# Patient Record
Sex: MALE | Race: ASIAN
[De-identification: names, ages, dates, MRNs, and addresses within clinical notes are randomized per-mention and may not be internally consistent; named-entity substitution may affect disease eponyms.]

---

## 2020-12-08 PROBLEM — Z00.00 ENCOUNTER FOR PREVENTIVE HEALTH EXAMINATION: Status: ACTIVE | Noted: 2020-12-08

## 2020-12-16 ENCOUNTER — APPOINTMENT (OUTPATIENT)
Dept: VASCULAR SURGERY | Facility: CLINIC | Age: 60
End: 2020-12-16
Payer: COMMERCIAL

## 2020-12-16 DIAGNOSIS — I83.899 VARICOSE VEINS OF UNSPECIFIED LOWER EXTREMITY WITH OTHER COMPLICATIONS: ICD-10-CM

## 2020-12-16 PROCEDURE — 93971 EXTREMITY STUDY: CPT

## 2020-12-16 PROCEDURE — 99204 OFFICE O/P NEW MOD 45 MIN: CPT

## 2020-12-16 PROCEDURE — 99072 ADDL SUPL MATRL&STAF TM PHE: CPT

## 2020-12-23 NOTE — ADDENDUM
[FreeTextEntry1] : This note was written by Lakeisha Tijerina on 12/16/2020 acting as scribe for Vic Wood M.D.\par \par I, Vic Vizcaino  have read and attest that all the information, medical decision making and discharge instructions within are true and accurate.

## 2020-12-23 NOTE — PHYSICAL EXAM
[Respiratory Effort] : normal respiratory effort [Normal Rate and Rhythm] : normal rate and rhythm [Alert] : alert [Oriented to Person] : oriented to person [Oriented to Place] : oriented to place [Oriented to Time] : oriented to time [Calm] : calm [Abdomen Tenderness] : ~T ~M No abdominal tenderness [de-identified] : Well appearing  [de-identified] : NC/AT  [de-identified] : Supple  [de-identified] : FROM 5/5 x 4.

## 2020-12-23 NOTE — HISTORY OF PRESENT ILLNESS
[FreeTextEntry1] : 60yoM  who works for the [employer], referred by a friend who works at [facility] for evaluation of his L thigh/calf varicose veins and swelling, and a L lateral ankle varicosity that ruptured 1mo prior and required suture ligation in the ED.  He denies associated itching/muscle cramps, and does not note any other ulcers of his legs.\par \par Pt denies known family h/o varicose veins, no smoking hx, and denies h/o DVT/SVT.  He recently started wearing compression stockings daily.  Mr. Barron stands for long periods of time at work, but is planning on retiring in February 2021.

## 2020-12-23 NOTE — ASSESSMENT
[FreeTextEntry1] : 60yoM  who works for the [employer], referred by a friend who works at [facility] for evaluation of his L thigh/calf varicose veins and swelling, and a L lateral ankle varicosity that ruptured 1mo prior and required suture ligation in the ED.  He denies associated itching/muscle cramps, and does not note any other ulcers of his legs.\par \par Pt denies known family h/o varicose veins, no smoking hx, and denies h/o DVT/SVT.  He recently started wearing compression stockings daily.  Mr. Barron stands for long periods of time at work, but is planning on retiring in February 2021.\par \par On exam today, the LLE is noted to have hyperpigmented skin and bulging varicose veins from the medial thigh to medial ankle, w/POS Trendelenburg's sign, and a healed ruptured varix and the lateral ankle.  Duplex reveals significant L GSV reflux and branch varicosities. \par \par Discussed with pt LLE RFA warranted at this time, discussed with pt if residual varicose veins become symptomatic procedure will be followed by LLE stab phlebectomy. The risks, benefits, convalescence and alternatives were reviewed. Numerous questions were asked and answered to his satisfaction. Will seek authorization from his insurance, once approved will contact pt to schedule procedure. Patient made aware in to contact the office in case he has any further questions.

## 2021-01-20 ENCOUNTER — APPOINTMENT (OUTPATIENT)
Dept: VASCULAR SURGERY | Facility: CLINIC | Age: 61
End: 2021-01-20
Payer: COMMERCIAL

## 2021-01-20 DIAGNOSIS — I87.2 VENOUS INSUFFICIENCY (CHRONIC) (PERIPHERAL): ICD-10-CM

## 2021-01-20 DIAGNOSIS — E78.5 HYPERLIPIDEMIA, UNSPECIFIED: ICD-10-CM

## 2021-01-20 PROCEDURE — 36475 ENDOVENOUS RF 1ST VEIN: CPT | Mod: LT

## 2021-01-20 PROCEDURE — 99072 ADDL SUPL MATRL&STAF TM PHE: CPT

## 2021-01-20 RX ORDER — ASPIRIN 81 MG
81 TABLET, DELAYED RELEASE (ENTERIC COATED) ORAL
Refills: 0 | Status: ACTIVE | COMMUNITY

## 2021-01-20 NOTE — ADDENDUM
[FreeTextEntry1] : This note was written by Lakeisha Tijerina on 01/20/2021 acting as scribe for Vic Wood M.D.\par \par I, Vic Vizcaino  have read and attest that all the information, medical decision making and discharge instructions within are true and accurate.

## 2021-01-20 NOTE — PROCEDURE
[FreeTextEntry1] : LLE RFA [FreeTextEntry2] : LLE GSV reflux [FreeTextEntry3] : Surgeon: Vic Handy MD\par \par Assistant: Riya Fernandez RVT\par \par Pre-Op Diagnosis:  Incompetent Left Greater Saphenous Vein\par \par Post-Op Diagnosis:  Same\par \par Procedure:  Transcatheter Occlusion of Left Greater Saphenous Vein using a Radio- Frequency Thermal Ablation (VNUS) ClosureFAST Catheter.\par \par Anesthesia: Local \par \par History: Symptomatic varicose veins\par \par Findings:  Complete occlusion of greater saphenous vein at end of procedure.\par \par Procedure: The patient’s leg was prepped and draped.  Under local 1% Lidocaine the greater saphenous vein was punctured below the knee with a micropuncture needle and then the guidewire was inserted into the vein.  This was performed under ultrasound guidance.  The skin was incised with a #11 Blade, a dilator was inserted and then the 7 Fr. Introducer was placed into the greater saphenous vein.  \par \par The fossa ovalis was visualized with the Duplex scan.  The common femoral vein was confirmed to be patent.  Duplex examination of the greater saphenous vein from the calf to the groin was performed.  The greater saphenous and inferior epigastric vein were identified and the VNUS catheter was introduced through the introducer and into the vein up to the fossa ovalis.  It was positioned 3 cm distal to the inferior epigastric vein.\par \par Tumescent solution (400 cc normal saline, 4cc of 8.4% Sodium bicarbonate and 40 cc 1% Lidoaine) was infiltrated subfascially over the vein.  \par \par The VNUS ClosureFAST catheter checked for proper function.  Thermal ablation was begun after the position of the catheter was once again confirmed to be 3 cm distal to the inferior epigastric branch of the greater saphenous vein. The proximal 7 cm was treated twice and then the rest of the vein was treated with single 20 sec cycles. The sheath and catheter were removed. Compression was applied for hemostasis.    \par \par Confirmation of common femoral vein patency and occlusion of the greater saphenous vein was made with duplex ultrasonography.  \par \par The leg was wrapped with gauze and Ace Bandages.  The patient remained on the table until alert and was then comfortable ambulating.           \par \par Patient will return in 2-3 days for repeat ultrasound to make sure GSV is closed and to check for heat induced thrombus.

## 2021-01-22 ENCOUNTER — APPOINTMENT (OUTPATIENT)
Dept: VASCULAR SURGERY | Facility: CLINIC | Age: 61
End: 2021-01-22
Payer: COMMERCIAL

## 2021-01-22 PROCEDURE — 99072 ADDL SUPL MATRL&STAF TM PHE: CPT

## 2021-01-22 PROCEDURE — 93971 EXTREMITY STUDY: CPT

## 2021-03-03 ENCOUNTER — APPOINTMENT (OUTPATIENT)
Dept: VASCULAR SURGERY | Facility: CLINIC | Age: 61
End: 2021-03-03
Payer: COMMERCIAL

## 2021-03-03 DIAGNOSIS — I87.2 VENOUS INSUFFICIENCY (CHRONIC) (PERIPHERAL): ICD-10-CM

## 2021-03-03 PROCEDURE — 93971 EXTREMITY STUDY: CPT

## 2021-03-03 PROCEDURE — 99213 OFFICE O/P EST LOW 20 MIN: CPT

## 2021-03-03 PROCEDURE — 99072 ADDL SUPL MATRL&STAF TM PHE: CPT

## 2021-03-05 NOTE — PHYSICAL EXAM
[Respiratory Effort] : normal respiratory effort [Normal Rate and Rhythm] : normal rate and rhythm [No Rash or Lesion] : No rash or lesion [Alert] : alert [Oriented to Person] : oriented to person [Oriented to Place] : oriented to place [Oriented to Time] : oriented to time [Calm] : calm [Ankle Swelling (On Exam)] : not present [Varicose Veins Of Lower Extremities] : not present [] : not present [Abdomen Tenderness] : ~T ~M No abdominal tenderness [de-identified] : Well appearing  [de-identified] : NC/AT  [de-identified] : Supple  [FreeTextEntry1] : LEs warm, no edema, skin intact [de-identified] : FROM

## 2021-03-05 NOTE — HISTORY OF PRESENT ILLNESS
[FreeTextEntry1] : 59 y/o M with varicose veins, LLE GSV reflux s/p LLE RFA on 1/20/2021. Patient initially referred by a friend who works at [employer]. He presents for follow up evaluation, one month after the RFA. Patient reports feeling well overall. He is very happy with the outcome. He notes his legs to feel lighter while walking and less swollen. He has been walking daily. Denies cramps, ulcers or symptomatic vv.\par \par Patient is an  who works for the [employer]

## 2021-03-05 NOTE — ASSESSMENT
[FreeTextEntry1] : 60 y/o with varicose veins, LLE GSV reflux s/p LLE RFA on 1/20/2021. On exam, skin intact, well perfused, no palpable cords throughout. Feet are pink, toes are warm to touch with adequate capillary refill. \par \par Plan: \par Patient recommended he continue to stay active through daily walking. He will f/u with us here PRN.

## 2021-03-05 NOTE — ADDENDUM
[FreeTextEntry1] : This note was written by Lakeisha Tijerina on 03/03/2021 acting as scribe for Ole Beaulieu M.D.\par \par I, Dr. Vic Handy  have read and attest that all the information, medical decision making and discharge instructions within are true and accurate.

## 2021-03-05 NOTE — PROCEDURE
[FreeTextEntry1] : LLE venous US shows: Negative DVT/SVT. GSV closed post RFA. Varicose veins at the calf improved.